# Patient Record
Sex: FEMALE
[De-identification: names, ages, dates, MRNs, and addresses within clinical notes are randomized per-mention and may not be internally consistent; named-entity substitution may affect disease eponyms.]

---

## 2023-08-16 ENCOUNTER — TRANSCRIPTION ENCOUNTER (OUTPATIENT)
Age: 38
End: 2023-08-16

## 2024-04-02 PROBLEM — Z00.00 ENCOUNTER FOR PREVENTIVE HEALTH EXAMINATION: Status: ACTIVE | Noted: 2024-04-02

## 2024-04-03 ENCOUNTER — NON-APPOINTMENT (OUTPATIENT)
Age: 39
End: 2024-04-03

## 2024-04-03 ENCOUNTER — APPOINTMENT (OUTPATIENT)
Dept: ORTHOPEDIC SURGERY | Facility: CLINIC | Age: 39
End: 2024-04-03
Payer: COMMERCIAL

## 2024-04-03 PROCEDURE — 73610 X-RAY EXAM OF ANKLE: CPT | Mod: RT

## 2024-04-03 PROCEDURE — 99203 OFFICE O/P NEW LOW 30 MIN: CPT | Mod: 25

## 2024-04-03 NOTE — PHYSICAL EXAM
[de-identified] : She is alert oriented x 3 pleasant cooperative.  Examination of the right ankle was undertaken.  There is moderate soft tissue swelling.  She is tender laterally alone.  Nontender medially over the syndesmosis.  Grossly intact range of motion.  Compartment soft compressible.  Neurovascular intact distally.

## 2024-04-03 NOTE — DISCUSSION/SUMMARY
[de-identified] : I discussed the patient's findings with her.  We will initiate care for this sprain versus avulsion fracture.  She can weight-bear as tolerated with a boot on.  Given the equinus tightness at this point I would recommend the patient undergo a daily stretching of her Achilles tendon.  I instructed her on how to do this.  She should ice and elevate.  Weight-bear as tolerated with boot.  I will see her back in 2 weeks.

## 2024-04-03 NOTE — DATA REVIEWED
[FreeTextEntry1] : 3 views right ankle ordered reviewed by me personally.  X-rays demonstrate evidence of a avulsion fracture of the lateral malleolus   Versus an os subfibulare.

## 2024-04-03 NOTE — HISTORY OF PRESENT ILLNESS
[de-identified] : 38-year-old patient see me first time in regards to her right ankle.  2 days ago she slipped and fell resulting in a twisting injury to her right ankle.  She was seen in urgent care where she was diagnosed with a fracture and splinted.  Patient notes that her pain is improving. Has never had an injury to her right ankle alone.

## 2024-04-10 ENCOUNTER — NON-APPOINTMENT (OUTPATIENT)
Age: 39
End: 2024-04-10

## 2024-04-17 ENCOUNTER — APPOINTMENT (OUTPATIENT)
Dept: ORTHOPEDIC SURGERY | Facility: CLINIC | Age: 39
End: 2024-04-17
Payer: COMMERCIAL

## 2024-04-17 ENCOUNTER — NON-APPOINTMENT (OUTPATIENT)
Age: 39
End: 2024-04-17

## 2024-04-17 DIAGNOSIS — S93.491A SPRAIN OF OTHER LIGAMENT OF RIGHT ANKLE, INITIAL ENCOUNTER: ICD-10-CM

## 2024-04-17 PROCEDURE — 99213 OFFICE O/P EST LOW 20 MIN: CPT

## 2024-04-17 NOTE — HISTORY OF PRESENT ILLNESS
[de-identified] : Patient here for follow-up of her right ankle.  She is doing better although she still notes significant swelling and pain.  Denies any calf pain chest pain shortness of breath.

## 2024-04-17 NOTE — PHYSICAL EXAM
[de-identified] : She is still somewhat swollen but not overly so.  Her compartments are soft compressible.  There is grossly intact range of motion.  Her ankle is tender over the lateral aspect of the ankle.  Nontender medially.  Ecchymoses present.  Neurovascular intact.

## 2024-04-17 NOTE — DISCUSSION/SUMMARY
[de-identified] : I reassured the patient that this amount of swelling can be normal for this point in the post injury course.  I would recommend a course of physical therapy along with gradually weaning off the boot.  Ice and elevate and take anti-inflammatory medication as necessary.  I will see her back in 1 month if she still symptomatic.  All questions answered.

## 2024-05-20 ENCOUNTER — APPOINTMENT (OUTPATIENT)
Dept: ORTHOPEDIC SURGERY | Facility: CLINIC | Age: 39
End: 2024-05-20